# Patient Record
Sex: MALE | Race: WHITE | NOT HISPANIC OR LATINO | Employment: OTHER | URBAN - METROPOLITAN AREA
[De-identification: names, ages, dates, MRNs, and addresses within clinical notes are randomized per-mention and may not be internally consistent; named-entity substitution may affect disease eponyms.]

---

## 2017-04-21 ENCOUNTER — PREPPED CHART (OUTPATIENT)
Dept: URBAN - METROPOLITAN AREA CLINIC 46 | Facility: CLINIC | Age: 77
End: 2017-04-21

## 2018-01-16 ASSESSMENT — TONOMETRY
OD_IOP_MMHG: 17
OS_IOP_MMHG: 16

## 2018-01-16 ASSESSMENT — VISUAL ACUITY
OS_CC: 20/20
OD_CC: 20/25
OS_CC: J1
OD_CC: J2

## 2018-03-09 ENCOUNTER — IOP CHECK (OUTPATIENT)
Dept: URBAN - METROPOLITAN AREA CLINIC 46 | Facility: CLINIC | Age: 78
End: 2018-03-09

## 2018-03-09 ENCOUNTER — ESTABLISHED COMPREHENSIVE EXAM (OUTPATIENT)
Dept: URBAN - METROPOLITAN AREA CLINIC 46 | Facility: CLINIC | Age: 78
End: 2018-03-09

## 2018-03-09 DIAGNOSIS — H40.1131: ICD-10-CM

## 2018-03-09 PROCEDURE — 3285F IOP DOWN <15% OF PRE-SVC LVL: CPT

## 2018-03-09 PROCEDURE — G8756 NO BP MEASURE DOC: HCPCS

## 2018-03-09 PROCEDURE — 1036F TOBACCO NON-USER: CPT

## 2018-03-09 PROCEDURE — 92083 EXTENDED VISUAL FIELD XM: CPT

## 2018-03-09 PROCEDURE — 2027F OPTIC NERVE HEAD EVAL DONE: CPT

## 2018-03-09 PROCEDURE — 0517F GLAUCOMA PLAN OF CARE DOCD: CPT

## 2018-03-09 PROCEDURE — G8428 CUR MEDS NOT DOCUMENT: HCPCS

## 2018-03-09 PROCEDURE — 92012 INTRM OPH EXAM EST PATIENT: CPT

## 2018-03-09 ASSESSMENT — TONOMETRY
OS_IOP_MMHG: 16
OD_IOP_MMHG: 16

## 2018-03-09 ASSESSMENT — VISUAL ACUITY
OD_CC: 20/25+2
OS_CC: 20/25+1

## 2019-05-24 ENCOUNTER — IOP CHECK (OUTPATIENT)
Dept: URBAN - METROPOLITAN AREA CLINIC 46 | Facility: CLINIC | Age: 79
End: 2019-05-24

## 2019-05-24 DIAGNOSIS — H40.1131: ICD-10-CM

## 2019-05-24 PROCEDURE — 92014 COMPRE OPH EXAM EST PT 1/>: CPT

## 2019-05-24 PROCEDURE — 92133 CPTRZD OPH DX IMG PST SGM ON: CPT

## 2019-05-24 ASSESSMENT — TONOMETRY
OD_IOP_MMHG: 19
OS_IOP_MMHG: 19

## 2019-05-24 ASSESSMENT — VISUAL ACUITY
OD_CC: 20/30+2
OS_CC: 20/30+1

## 2020-01-10 ENCOUNTER — DILATED FUNDUS EXAM (OUTPATIENT)
Dept: URBAN - METROPOLITAN AREA CLINIC 36 | Facility: CLINIC | Age: 80
End: 2020-01-10

## 2020-01-10 DIAGNOSIS — H40.1131: ICD-10-CM

## 2020-01-10 PROCEDURE — 92012 INTRM OPH EXAM EST PATIENT: CPT

## 2020-01-10 ASSESSMENT — VISUAL ACUITY
OD_CC: 20/30
OS_CC: 20/30+2

## 2020-01-10 ASSESSMENT — TONOMETRY
OD_IOP_MMHG: 16
OS_IOP_MMHG: 15

## 2020-07-24 ENCOUNTER — ESTABLISHED COMPREHENSIVE EXAM (OUTPATIENT)
Dept: URBAN - METROPOLITAN AREA CLINIC 46 | Facility: CLINIC | Age: 80
End: 2020-07-24

## 2020-07-24 DIAGNOSIS — H40.1131: ICD-10-CM

## 2020-07-24 PROCEDURE — 92250 FUNDUS PHOTOGRAPHY W/I&R: CPT

## 2020-07-24 PROCEDURE — 92014 COMPRE OPH EXAM EST PT 1/>: CPT

## 2020-07-24 RX ORDER — BIMATOPROST 0.1 MG/ML: 1 SOLUTION/ DROPS OPHTHALMIC EVERY EVENING

## 2020-07-24 ASSESSMENT — VISUAL ACUITY
OS_SC: J12
OD_CC: J2
OS_CC: 20/30-2
OD_CC: 20/30-2
OS_AM: 20/20
OD_SC: J12
OD_SC: 20/50-2
OD_BAT: 20/80
OS_CC: J3
OS_SC: 20/40
OS_BAT: 20/100
OD_RAM: 20/20

## 2020-07-24 ASSESSMENT — TONOMETRY
OS_IOP_MMHG: 20
OD_IOP_MMHG: 21

## 2020-08-21 ENCOUNTER — CATARACT CONSULT (OUTPATIENT)
Dept: URBAN - METROPOLITAN AREA CLINIC 46 | Facility: CLINIC | Age: 80
End: 2020-08-21

## 2020-08-21 DIAGNOSIS — H40.1131: ICD-10-CM

## 2020-08-21 DIAGNOSIS — I10: ICD-10-CM

## 2020-08-21 DIAGNOSIS — H25.813: ICD-10-CM

## 2020-08-21 DIAGNOSIS — H04.123: ICD-10-CM

## 2020-08-21 PROCEDURE — 92134 CPTRZ OPH DX IMG PST SGM RTA: CPT

## 2020-08-21 PROCEDURE — 92133 CPTRZD OPH DX IMG PST SGM ON: CPT

## 2020-08-21 PROCEDURE — 92136TC INTERFEROMETRY - TECHNICAL COMPONENT

## 2020-08-21 PROCEDURE — 92012 INTRM OPH EXAM EST PATIENT: CPT

## 2020-08-21 PROCEDURE — 92025-3 CORNEAL TOPO, REFUSED

## 2020-08-21 RX ORDER — BRIMONIDINE TARTRATE 1 MG/ML
1 SOLUTION/ DROPS OPHTHALMIC TWICE A DAY
Start: 2020-08-21

## 2020-08-21 ASSESSMENT — VISUAL ACUITY
OS_CC: 20/30+1
OD_CC: 20/30-1
OS_BAT: 20/100
OS_AM: 20/20
OD_BAT: 20/80
OD_RAM: 20/20

## 2020-08-21 ASSESSMENT — TONOMETRY
OD_IOP_MMHG: 20
OS_IOP_MMHG: 21

## 2020-10-09 ENCOUNTER — IOP CHECK (OUTPATIENT)
Dept: URBAN - METROPOLITAN AREA CLINIC 46 | Facility: CLINIC | Age: 80
End: 2020-10-09

## 2020-10-09 DIAGNOSIS — H40.1131: ICD-10-CM

## 2020-10-09 PROCEDURE — 92012 INTRM OPH EXAM EST PATIENT: CPT

## 2020-10-09 ASSESSMENT — VISUAL ACUITY
OS_CC: 20/30
OD_CC: 20/40+2

## 2020-10-09 ASSESSMENT — TONOMETRY
OS_IOP_MMHG: 18
OD_IOP_MMHG: 17

## 2021-02-05 ENCOUNTER — CATARACT CONSULT (OUTPATIENT)
Dept: URBAN - METROPOLITAN AREA CLINIC 46 | Facility: CLINIC | Age: 81
End: 2021-02-05

## 2021-02-05 DIAGNOSIS — H18.623: ICD-10-CM

## 2021-02-05 DIAGNOSIS — H40.1131: ICD-10-CM

## 2021-02-05 DIAGNOSIS — H25.813: ICD-10-CM

## 2021-02-05 PROCEDURE — 92133 CPTRZD OPH DX IMG PST SGM ON: CPT

## 2021-02-05 PROCEDURE — 92136TC INTERFEROMETRY - TECHNICAL COMPONENT

## 2021-02-05 PROCEDURE — 92025-1 CORNEAL TOPOGRAPHY, INS

## 2021-02-05 PROCEDURE — 92014 COMPRE OPH EXAM EST PT 1/>: CPT

## 2021-02-05 PROCEDURE — V2799PMN IMPRIMIS PRED-MOXI-NEPAF 5ML

## 2021-02-05 ASSESSMENT — TONOMETRY
OS_IOP_MMHG: 22
OD_IOP_MMHG: 21

## 2021-02-05 ASSESSMENT — VISUAL ACUITY
OS_AM: 20/20
OD_BAT: 20/80
OS_SC: J10
OD_SC: J10
OD_CC: 20/40+1
OS_CC: 20/30+1
OS_BAT: 20/80
OD_RAM: 20/25+1
OS_CC: J2-
OD_CC: J2-
OS_SC: 20/50-2
OD_SC: 20/40-1

## 2021-02-16 ENCOUNTER — SURGERY/PROCEDURE (OUTPATIENT)
Dept: URBAN - METROPOLITAN AREA CLINIC 46 | Facility: CLINIC | Age: 81
End: 2021-02-16

## 2021-02-16 ENCOUNTER — PRE-OP/H&P (OUTPATIENT)
Dept: URBAN - METROPOLITAN AREA CLINIC 39 | Facility: CLINIC | Age: 81
End: 2021-02-16

## 2021-02-16 DIAGNOSIS — H18.623: ICD-10-CM

## 2021-02-16 DIAGNOSIS — H40.1121: ICD-10-CM

## 2021-02-16 DIAGNOSIS — H25.813: ICD-10-CM

## 2021-02-16 DIAGNOSIS — H25.812: ICD-10-CM

## 2021-02-16 DIAGNOSIS — H40.1131: ICD-10-CM

## 2021-02-16 PROCEDURE — 99211HP H&P OFFICE/OUTPATIENT VISIT, EST

## 2021-02-16 PROCEDURE — 0191TW INJECT W ISTENT, AQUEOUS SHUNT

## 2021-02-16 PROCEDURE — 0376T INSERTION OF ANTERIOR SEGMENT AQUEOUS DRAINAGE DEVICE, WITHOUT EXTRAOCULAR RESERVOIR, INTERNAL APPROACH, INTO THE TRABECULAR MESHWORK; EACH ADDITIONAL DEVICE INSERTION (LIST SEPARATELY IN ADDITION TO CODE FOR PRIMARY PROCEDURE): CPT

## 2021-02-16 PROCEDURE — 66984 XCAPSL CTRC RMVL W/O ECP: CPT

## 2021-02-17 ENCOUNTER — CATARACT POST-OP 1-DAY (OUTPATIENT)
Dept: URBAN - METROPOLITAN AREA CLINIC 39 | Facility: CLINIC | Age: 81
End: 2021-02-17

## 2021-02-17 DIAGNOSIS — Z96.1: ICD-10-CM

## 2021-02-17 ASSESSMENT — TONOMETRY
OS_IOP_MMHG: 12
OD_IOP_MMHG: 16

## 2021-02-17 ASSESSMENT — VISUAL ACUITY
OS_SC: 20/100-1
OS_SC: J5
OS_PH: 20/40-2

## 2021-02-22 ENCOUNTER — PREPPED CHART (OUTPATIENT)
Dept: URBAN - METROPOLITAN AREA CLINIC 39 | Facility: CLINIC | Age: 81
End: 2021-02-22

## 2021-02-22 DIAGNOSIS — H25.811: ICD-10-CM

## 2021-02-22 DIAGNOSIS — Z96.1: ICD-10-CM

## 2021-02-22 PROCEDURE — 99024 POSTOP FOLLOW-UP VISIT: CPT

## 2021-02-23 ENCOUNTER — POST OP/EVAL OF SECOND EYE (OUTPATIENT)
Dept: URBAN - METROPOLITAN AREA CLINIC 46 | Facility: CLINIC | Age: 81
End: 2021-02-23

## 2021-02-23 DIAGNOSIS — H25.811: ICD-10-CM

## 2021-02-23 DIAGNOSIS — H18.623: ICD-10-CM

## 2021-02-23 DIAGNOSIS — H04.123: ICD-10-CM

## 2021-02-23 DIAGNOSIS — H40.1131: ICD-10-CM

## 2021-02-23 DIAGNOSIS — Z96.1: ICD-10-CM

## 2021-02-23 PROCEDURE — 99024 POSTOP FOLLOW-UP VISIT: CPT

## 2021-02-23 PROCEDURE — 99213 OFFICE O/P EST LOW 20 MIN: CPT

## 2021-02-23 ASSESSMENT — TONOMETRY
OD_IOP_MMHG: 16
OS_IOP_MMHG: 15

## 2021-02-23 ASSESSMENT — VISUAL ACUITY
OD_CC: 20/40-1
OD_RAM: 20/25+1
OD_BAT: 20/80
OS_SC: J10
OD_SC: J10
OS_SC: 20/50
OD_SC: 20/40-1
OD_CC: J2-

## 2021-03-16 ENCOUNTER — POST-OP CATARACT (OUTPATIENT)
Dept: URBAN - METROPOLITAN AREA CLINIC 39 | Facility: CLINIC | Age: 81
End: 2021-03-16

## 2021-03-16 ENCOUNTER — SURGERY/PROCEDURE (OUTPATIENT)
Dept: URBAN - METROPOLITAN AREA CLINIC 46 | Facility: CLINIC | Age: 81
End: 2021-03-16

## 2021-03-16 DIAGNOSIS — Z96.1: ICD-10-CM

## 2021-03-16 DIAGNOSIS — H25.811: ICD-10-CM

## 2021-03-16 DIAGNOSIS — H40.1111: ICD-10-CM

## 2021-03-16 PROCEDURE — 0191TW INJECT W ISTENT, AQUEOUS SHUNT

## 2021-03-16 PROCEDURE — 99024 POSTOP FOLLOW-UP VISIT: CPT

## 2021-03-16 PROCEDURE — 66984 XCAPSL CTRC RMVL W/O ECP: CPT

## 2021-03-17 ENCOUNTER — CATARACT POST-OP 1-DAY (OUTPATIENT)
Dept: URBAN - METROPOLITAN AREA CLINIC 46 | Facility: CLINIC | Age: 81
End: 2021-03-17

## 2021-03-17 DIAGNOSIS — Z96.1: ICD-10-CM

## 2021-03-17 PROCEDURE — 99024 POSTOP FOLLOW-UP VISIT: CPT

## 2021-03-17 ASSESSMENT — VISUAL ACUITY
OS_SC: 20/70
OD_SC: J5
OD_SC: 20/40
OS_SC: J3

## 2021-03-17 ASSESSMENT — TONOMETRY
OD_IOP_MMHG: 24
OS_IOP_MMHG: 15

## 2021-04-26 ENCOUNTER — POST-OP CATARACT (OUTPATIENT)
Dept: URBAN - METROPOLITAN AREA CLINIC 46 | Facility: CLINIC | Age: 81
End: 2021-04-26

## 2021-04-26 DIAGNOSIS — Z96.1: ICD-10-CM

## 2021-04-26 PROCEDURE — 99024 POSTOP FOLLOW-UP VISIT: CPT

## 2021-04-26 ASSESSMENT — VISUAL ACUITY
OD_PH: 20/40
OS_SC: 20/70
OD_SC: 20/100+1
OS_SC: J2
OD_SC: J3
OS_PH: 20/40

## 2021-04-26 ASSESSMENT — TONOMETRY
OD_IOP_MMHG: 14
OS_IOP_MMHG: 14

## 2021-09-30 ENCOUNTER — PREPPED CHART (OUTPATIENT)
Dept: URBAN - METROPOLITAN AREA CLINIC 30 | Facility: CLINIC | Age: 81
End: 2021-09-30

## 2021-09-30 DIAGNOSIS — H18.603: ICD-10-CM

## 2021-09-30 DIAGNOSIS — H40.1131: ICD-10-CM

## 2021-09-30 DIAGNOSIS — Z96.1: ICD-10-CM

## 2021-09-30 DIAGNOSIS — H18.623: ICD-10-CM

## 2021-09-30 DIAGNOSIS — H04.123: ICD-10-CM

## 2021-09-30 PROCEDURE — 99214 OFFICE O/P EST MOD 30 MIN: CPT

## 2021-09-30 PROCEDURE — 92083 EXTENDED VISUAL FIELD XM: CPT

## 2021-11-18 ENCOUNTER — ESTABLISHED COMPREHENSIVE EXAM (OUTPATIENT)
Dept: URBAN - METROPOLITAN AREA CLINIC 30 | Facility: CLINIC | Age: 81
End: 2021-11-18

## 2021-11-18 DIAGNOSIS — Z96.1: ICD-10-CM

## 2021-11-18 DIAGNOSIS — H04.123: ICD-10-CM

## 2021-11-18 DIAGNOSIS — H40.1131: ICD-10-CM

## 2021-11-18 DIAGNOSIS — H18.623: ICD-10-CM

## 2021-11-18 DIAGNOSIS — H18.603: ICD-10-CM

## 2021-11-18 PROCEDURE — 99214 OFFICE O/P EST MOD 30 MIN: CPT

## 2021-11-18 PROCEDURE — 92250 FUNDUS PHOTOGRAPHY W/I&R: CPT

## 2021-11-18 PROCEDURE — 92083 EXTENDED VISUAL FIELD XM: CPT

## 2021-11-18 ASSESSMENT — VISUAL ACUITY
OD_SC: 20/60
OS_CC: 20/25
OU_CC: 20/20
OS_SC: 20/40
OD_CC: 20/20
OU_SC: 20/30
OD_PH: 20/30-1
OS_SC: 20/50
OS_CC: 20/25+1
OD_SC: 20/50
OD_CC: 20/70-2
OU_CC: 20/25
OU_SC: 20/40-1
OS_PH: 20/25

## 2021-11-18 ASSESSMENT — TONOMETRY
OS_IOP_MMHG: 13
OD_IOP_MMHG: 12

## 2021-12-09 ENCOUNTER — ESTABLISHED COMPREHENSIVE EXAM (OUTPATIENT)
Dept: URBAN - METROPOLITAN AREA CLINIC 30 | Facility: CLINIC | Age: 81
End: 2021-12-09

## 2021-12-09 DIAGNOSIS — Z96.1: ICD-10-CM

## 2021-12-09 DIAGNOSIS — H40.1131: ICD-10-CM

## 2021-12-09 PROCEDURE — 99213 OFFICE O/P EST LOW 20 MIN: CPT

## 2021-12-09 PROCEDURE — 92015 DETERMINE REFRACTIVE STATE: CPT

## 2021-12-09 PROCEDURE — 92133 CPTRZD OPH DX IMG PST SGM ON: CPT

## 2021-12-09 ASSESSMENT — VISUAL ACUITY
OU_CC: 20/25+2
OS_CC: 20/25+2
OD_PH: 20/25
OD_CC: 20/40+1

## 2021-12-09 ASSESSMENT — KERATOMETRY
OS_AXISANGLE_DEGREES: 168
OD_AXISANGLE_DEGREES: 177
OD_K2POWER_DIOPTERS: 41.00
OS_K1POWER_DIOPTERS: 43.00
OD_K1POWER_DIOPTERS: 42.75
OS_K2POWER_DIOPTERS: 42.00
OD_AXISANGLE2_DEGREES: 87
OS_AXISANGLE2_DEGREES: 78

## 2021-12-09 ASSESSMENT — TONOMETRY
OS_IOP_MMHG: 13
OD_IOP_MMHG: 14

## 2022-03-28 ASSESSMENT — KERATOMETRY
OS_AXISANGLE2_DEGREES: 78
OS_K2POWER_DIOPTERS: 42.00
OD_K2POWER_DIOPTERS: 41.00
OS_AXISANGLE_DEGREES: 168
OD_AXISANGLE_DEGREES: 177
OS_K1POWER_DIOPTERS: 43.00
OD_K1POWER_DIOPTERS: 42.75
OD_AXISANGLE2_DEGREES: 87

## 2022-04-06 ENCOUNTER — ESTABLISHED PATIENT (OUTPATIENT)
Dept: URBAN - METROPOLITAN AREA CLINIC 30 | Facility: CLINIC | Age: 82
End: 2022-04-06

## 2024-01-12 ASSESSMENT — KERATOMETRY
OS_AXISANGLE2_DEGREES: 78
OS_K2POWER_DIOPTERS: 42.00
OS_AXISANGLE_DEGREES: 168
OD_AXISANGLE_DEGREES: 177
OD_AXISANGLE2_DEGREES: 87
OD_K2POWER_DIOPTERS: 41.00
OS_K1POWER_DIOPTERS: 43.00
OD_K1POWER_DIOPTERS: 42.75

## 2024-01-17 ENCOUNTER — COMPREHENSIVE EXAM (OUTPATIENT)
Dept: URBAN - METROPOLITAN AREA CLINIC 46 | Facility: CLINIC | Age: 84
End: 2024-01-17

## 2024-01-17 DIAGNOSIS — H18.603: ICD-10-CM

## 2024-01-17 DIAGNOSIS — H04.123: ICD-10-CM

## 2024-01-17 DIAGNOSIS — H26.493: ICD-10-CM

## 2024-01-17 DIAGNOSIS — H40.1131: ICD-10-CM

## 2024-01-17 DIAGNOSIS — H52.7: ICD-10-CM

## 2024-01-17 PROCEDURE — 92014 COMPRE OPH EXAM EST PT 1/>: CPT

## 2024-01-17 PROCEDURE — 92015 DETERMINE REFRACTIVE STATE: CPT

## 2024-01-17 ASSESSMENT — VISUAL ACUITY
OD_SC: >J10
OD_BAT: 20/400
OS_CC: J3
OD_CC: J3
OS_CC: 20/40
OD_SC: 20/60
OD_CC: 20/50
OS_SC: 20/40
OS_BAT: 20/400
OS_SC: >J10

## 2024-01-17 ASSESSMENT — TONOMETRY
OD_IOP_MMHG: 15
OS_IOP_MMHG: 16

## 2024-02-23 ENCOUNTER — CONSULTATION/EVALUATION (OUTPATIENT)
Dept: URBAN - METROPOLITAN AREA CLINIC 36 | Facility: CLINIC | Age: 84
End: 2024-02-23

## 2024-02-23 DIAGNOSIS — H40.1131: ICD-10-CM

## 2024-02-23 DIAGNOSIS — H26.493: ICD-10-CM

## 2024-02-23 PROCEDURE — 66821 AFTER CATARACT LASER SURGERY: CPT

## 2024-02-23 PROCEDURE — 99204 OFFICE O/P NEW MOD 45 MIN: CPT

## 2024-02-23 ASSESSMENT — VISUAL ACUITY
OD_CC: 20/40-2
OS_SC: 20/70
OD_CC: J3
OS_CC: 20/50
OD_BAT: 20/400
OS_SC: <J12
OD_SC: <J12
OS_CC: J3
OS_BAT: 20/400
OD_SC: 20/60

## 2024-02-23 ASSESSMENT — KERATOMETRY
OD_K2POWER_DIOPTERS: 41.00
OS_K2POWER_DIOPTERS: 42.00
OD_AXISANGLE2_DEGREES: 87
OS_AXISANGLE_DEGREES: 168
OS_K1POWER_DIOPTERS: 43.00
OD_AXISANGLE_DEGREES: 177
OS_AXISANGLE2_DEGREES: 78
OD_K1POWER_DIOPTERS: 42.75

## 2024-02-23 ASSESSMENT — TONOMETRY
OS_IOP_MMHG: 16
OD_IOP_MMHG: 14

## 2024-03-02 ASSESSMENT — KERATOMETRY
OS_K1POWER_DIOPTERS: 43.00
OD_AXISANGLE2_DEGREES: 87
OD_K2POWER_DIOPTERS: 41.00
OS_K2POWER_DIOPTERS: 42.00
OS_AXISANGLE_DEGREES: 168
OS_AXISANGLE2_DEGREES: 78
OD_K1POWER_DIOPTERS: 42.75
OD_AXISANGLE_DEGREES: 177

## 2024-03-25 ENCOUNTER — POST-OP (OUTPATIENT)
Dept: URBAN - METROPOLITAN AREA CLINIC 46 | Facility: CLINIC | Age: 84
End: 2024-03-25

## 2024-03-25 DIAGNOSIS — Z98.890: ICD-10-CM

## 2024-03-25 PROCEDURE — 99024 POSTOP FOLLOW-UP VISIT: CPT

## 2024-03-25 ASSESSMENT — VISUAL ACUITY
OD_SC: J10
OD_CC: 20/40
OS_CC: 20/20
OD_SC: 20/50
OS_SC: 20/30+2
OD_CC: J1+
OS_SC: J10
OS_CC: J1

## 2024-03-25 ASSESSMENT — TONOMETRY
OD_IOP_MMHG: 16
OS_IOP_MMHG: 16

## 2025-01-29 ENCOUNTER — APPOINTMENT (OUTPATIENT)
Dept: URBAN - METROPOLITAN AREA CLINIC 116 | Facility: CLINIC | Age: 85
Setting detail: DERMATOLOGY
End: 2025-01-29

## 2025-01-29 DIAGNOSIS — L82.1 OTHER SEBORRHEIC KERATOSIS: ICD-10-CM

## 2025-01-29 DIAGNOSIS — L21.8 OTHER SEBORRHEIC DERMATITIS: ICD-10-CM | Status: INADEQUATELY CONTROLLED

## 2025-01-29 DIAGNOSIS — D18.0 HEMANGIOMA: ICD-10-CM

## 2025-01-29 DIAGNOSIS — L57.0 ACTINIC KERATOSIS: ICD-10-CM

## 2025-01-29 DIAGNOSIS — L81.4 OTHER MELANIN HYPERPIGMENTATION: ICD-10-CM

## 2025-01-29 PROBLEM — D18.01 HEMANGIOMA OF SKIN AND SUBCUTANEOUS TISSUE: Status: ACTIVE | Noted: 2025-01-29

## 2025-01-29 PROCEDURE — ? PRESCRIPTION

## 2025-01-29 PROCEDURE — 17000 DESTRUCT PREMALG LESION: CPT

## 2025-01-29 PROCEDURE — ? COUNSELING

## 2025-01-29 PROCEDURE — ? LIQUID NITROGEN

## 2025-01-29 PROCEDURE — 99204 OFFICE O/P NEW MOD 45 MIN: CPT | Mod: 25

## 2025-01-29 RX ORDER — DESONIDE 0.5 MG/G
CREAM TOPICAL BID
Qty: 60 | Refills: 3 | Status: ERX | COMMUNITY
Start: 2025-01-29

## 2025-01-29 RX ORDER — KETOCONAZOLE 20 MG/G
CREAM TOPICAL
Qty: 60 | Refills: 0 | Status: ERX | COMMUNITY
Start: 2025-01-29

## 2025-01-29 RX ORDER — SULFACETAMIDE SODIUM, SULFUR 98; 48 MG/G; MG/G
LIQUID TOPICAL
Qty: 285 | Refills: 2 | Status: ERX | COMMUNITY
Start: 2025-01-29

## 2025-01-29 RX ADMIN — KETOCONAZOLE: 20 CREAM TOPICAL at 00:00

## 2025-01-29 RX ADMIN — SULFACETAMIDE SODIUM, SULFUR: 98; 48 LIQUID TOPICAL at 00:00

## 2025-01-29 RX ADMIN — DESONIDE: 0.5 CREAM TOPICAL at 00:00

## 2025-01-29 ASSESSMENT — LOCATION DETAILED DESCRIPTION DERM
LOCATION DETAILED: RIGHT CENTRAL EYEBROW
LOCATION DETAILED: LEFT PROXIMAL PRETIBIAL REGION
LOCATION DETAILED: RIGHT ANTERIOR SHOULDER
LOCATION DETAILED: RIGHT PROXIMAL PRETIBIAL REGION
LOCATION DETAILED: LEFT INFERIOR CENTRAL MALAR CHEEK
LOCATION DETAILED: LEFT CENTRAL EYEBROW
LOCATION DETAILED: LEFT PROXIMAL DORSAL FOREARM
LOCATION DETAILED: RIGHT UPPER CUTANEOUS LIP
LOCATION DETAILED: LEFT SUPERIOR PARIETAL SCALP
LOCATION DETAILED: PERIUMBILICAL SKIN
LOCATION DETAILED: LEFT CLAVICULAR NECK
LOCATION DETAILED: LEFT RIB CAGE
LOCATION DETAILED: RIGHT PROXIMAL DORSAL FOREARM
LOCATION DETAILED: LEFT MEDIAL UPPER BACK

## 2025-01-29 ASSESSMENT — LOCATION ZONE DERM
LOCATION ZONE: SCALP
LOCATION ZONE: LEG
LOCATION ZONE: FACE
LOCATION ZONE: NECK
LOCATION ZONE: TRUNK
LOCATION ZONE: ARM
LOCATION ZONE: LIP

## 2025-01-29 ASSESSMENT — LOCATION SIMPLE DESCRIPTION DERM
LOCATION SIMPLE: LEFT EYEBROW
LOCATION SIMPLE: RIGHT FOREARM
LOCATION SIMPLE: RIGHT LIP
LOCATION SIMPLE: LEFT ANTERIOR NECK
LOCATION SIMPLE: SCALP
LOCATION SIMPLE: RIGHT EYEBROW
LOCATION SIMPLE: LEFT FOREARM
LOCATION SIMPLE: LEFT PRETIBIAL REGION
LOCATION SIMPLE: LEFT UPPER BACK
LOCATION SIMPLE: RIGHT PRETIBIAL REGION
LOCATION SIMPLE: ABDOMEN
LOCATION SIMPLE: LEFT CHEEK
LOCATION SIMPLE: RIGHT SHOULDER

## 2025-01-29 NOTE — PROCEDURE: LIQUID NITROGEN
Render Post-Care Instructions In Note?: yes
Number Of Freeze-Thaw Cycles: 3 freeze-thaw cycles
Detail Level: Detailed
Post-Care Instructions: I reviewed with the patient in detail post-care instructions. Patient is to wear sunprotection, and avoid picking at any of the treated lesions. Pt may apply Vaseline to crusted or scabbing areas. Patient has also received a handout with instructions on caring for the wound and office contact information.
Consent: The patient's consent was obtained including but not limited to risks of crusting, scabbing, blistering, scarring, darker or lighter pigmentary change, recurrence, incomplete removal and infection.
Duration Of Freeze Thaw-Cycle (Seconds): 3

## 2025-01-29 NOTE — PROCEDURE: COUNSELING
Cleanser Recommendations: Dove soap\\nUnscented laundry detergent
Detail Level: Detailed
Detail Level: Generalized

## 2025-06-17 ENCOUNTER — COMPREHENSIVE EXAM (OUTPATIENT)
Age: 85
End: 2025-06-17

## 2025-06-17 DIAGNOSIS — Z96.1: ICD-10-CM

## 2025-06-17 DIAGNOSIS — H52.4: ICD-10-CM

## 2025-06-17 DIAGNOSIS — H40.1131: ICD-10-CM

## 2025-06-17 DIAGNOSIS — H04.123: ICD-10-CM

## 2025-06-17 PROCEDURE — 92499RS OCT RETINAL SCREENING, ELECTIVE

## 2025-06-17 PROCEDURE — 92015 DETERMINE REFRACTIVE STATE: CPT

## 2025-06-17 PROCEDURE — 92014 COMPRE OPH EXAM EST PT 1/>: CPT

## 2025-06-25 ENCOUNTER — APPOINTMENT (OUTPATIENT)
Dept: URBAN - METROPOLITAN AREA CLINIC 116 | Facility: CLINIC | Age: 85
Setting detail: DERMATOLOGY
End: 2025-06-25

## 2025-06-25 DIAGNOSIS — Z71.89 OTHER SPECIFIED COUNSELING: ICD-10-CM

## 2025-06-25 DIAGNOSIS — L57.0 ACTINIC KERATOSIS: ICD-10-CM

## 2025-06-25 DIAGNOSIS — L57.8 OTHER SKIN CHANGES DUE TO CHRONIC EXPOSURE TO NONIONIZING RADIATION: ICD-10-CM

## 2025-06-25 DIAGNOSIS — L85.3 XEROSIS CUTIS: ICD-10-CM

## 2025-06-25 DIAGNOSIS — L82.1 OTHER SEBORRHEIC KERATOSIS: ICD-10-CM

## 2025-06-25 DIAGNOSIS — D18.0 HEMANGIOMA: ICD-10-CM

## 2025-06-25 PROBLEM — D18.01 HEMANGIOMA OF SKIN AND SUBCUTANEOUS TISSUE: Status: ACTIVE | Noted: 2025-06-25

## 2025-06-25 PROCEDURE — ?

## 2025-06-25 PROCEDURE — ?: Mod: 25

## 2025-06-25 PROCEDURE — ? COUNSELING

## 2025-06-25 PROCEDURE — ? SUNSCREEN RECOMMENDATIONS

## 2025-06-25 PROCEDURE — ? LIQUID NITROGEN

## 2025-06-25 ASSESSMENT — LOCATION DETAILED DESCRIPTION DERM
LOCATION DETAILED: RIGHT CENTRAL MALAR CHEEK
LOCATION DETAILED: LEFT MEDIAL INFERIOR CHEST
LOCATION DETAILED: RIGHT PROXIMAL PRETIBIAL REGION
LOCATION DETAILED: PERIUMBILICAL SKIN
LOCATION DETAILED: RIGHT SUPERIOR FOREHEAD
LOCATION DETAILED: LEFT DISTAL PRETIBIAL REGION
LOCATION DETAILED: LEFT CENTRAL ZYGOMA
LOCATION DETAILED: RIGHT INFERIOR UPPER BACK
LOCATION DETAILED: RIGHT MEDIAL SUPERIOR CHEST
LOCATION DETAILED: RIGHT CENTRAL TEMPLE

## 2025-06-25 ASSESSMENT — LOCATION SIMPLE DESCRIPTION DERM
LOCATION SIMPLE: RIGHT PRETIBIAL REGION
LOCATION SIMPLE: RIGHT UPPER BACK
LOCATION SIMPLE: CHEST
LOCATION SIMPLE: ABDOMEN
LOCATION SIMPLE: RIGHT CHEEK
LOCATION SIMPLE: LEFT ZYGOMA
LOCATION SIMPLE: RIGHT FOREHEAD
LOCATION SIMPLE: RIGHT TEMPLE
LOCATION SIMPLE: LEFT PRETIBIAL REGION

## 2025-06-25 ASSESSMENT — LOCATION ZONE DERM
LOCATION ZONE: TRUNK
LOCATION ZONE: FACE
LOCATION ZONE: LEG

## 2025-06-25 NOTE — PROCEDURE: LIQUID NITROGEN
Duration Of Freeze Thaw-Cycle (Seconds): 3
Detail Level: Zone
Show Applicator Variable?: Yes
Render Post-Care Instructions In Note?: no
Number Of Freeze-Thaw Cycles: 2 freeze-thaw cycles
Consent: The patient's consent was obtained including but not limited to risks of crusting, scabbing, blistering, scarring, darker or lighter pigmentary change, recurrence, incomplete removal and infection.
Post-Care Instructions: I reviewed with the patient in detail post-care instructions. Patient is to wear sunprotection, and avoid picking at any of the treated lesions. Pt may apply Vaseline to crusted or scabbing areas.